# Patient Record
Sex: FEMALE
[De-identification: names, ages, dates, MRNs, and addresses within clinical notes are randomized per-mention and may not be internally consistent; named-entity substitution may affect disease eponyms.]

---

## 2021-03-08 ENCOUNTER — HOSPITAL ENCOUNTER (OUTPATIENT)
Dept: HOSPITAL 90 - RAH | Age: 66
Discharge: HOME | End: 2021-03-08
Attending: FAMILY MEDICINE
Payer: COMMERCIAL

## 2021-03-08 DIAGNOSIS — Z12.31: Primary | ICD-10-CM

## 2021-03-08 PROCEDURE — 77067 SCR MAMMO BI INCL CAD: CPT

## 2022-08-18 ENCOUNTER — HOSPITAL ENCOUNTER (OUTPATIENT)
Dept: HOSPITAL 90 - LAB | Age: 67
Discharge: HOME | End: 2022-08-18
Attending: FAMILY MEDICINE
Payer: COMMERCIAL
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
## 2022-08-18 DIAGNOSIS — M19.071: ICD-10-CM

## 2022-08-18 DIAGNOSIS — M21.071: ICD-10-CM

## 2022-08-18 DIAGNOSIS — S90.119A: Primary | ICD-10-CM
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
## 2022-08-18 PROCEDURE — 73630 X-RAY EXAM OF FOOT: CPT

## 2023-01-30 ENCOUNTER — HOSPITAL ENCOUNTER (OUTPATIENT)
Dept: HOSPITAL 90 - RAH | Age: 68
Discharge: HOME | End: 2023-01-30
Attending: FAMILY MEDICINE
Payer: COMMERCIAL

## 2023-01-30 DIAGNOSIS — Z12.31: Primary | ICD-10-CM

## 2023-01-30 PROCEDURE — 77067 SCR MAMMO BI INCL CAD: CPT

## 2024-12-04 ENCOUNTER — HOSPITAL ENCOUNTER (OUTPATIENT)
Dept: HOSPITAL 90 - RAH | Age: 69
Discharge: HOME | End: 2024-12-04
Attending: STUDENT IN AN ORGANIZED HEALTH CARE EDUCATION/TRAINING PROGRAM
Payer: COMMERCIAL

## 2024-12-04 DIAGNOSIS — Z13.820: Primary | ICD-10-CM

## 2024-12-04 DIAGNOSIS — M81.0: ICD-10-CM

## 2024-12-04 PROCEDURE — 77080 DXA BONE DENSITY AXIAL: CPT

## 2024-12-04 NOTE — [facility]
DEXA BONE DENSITY SURVEY



HISTORY: Osteoporosis



COMPARISON: None



FINDINGS: Bone densitometry study was performed. Bone mineral density of

the lumbar spine is 1.291 gram per centimeter square which corresponds

to a T score of 2.2 and a Z score of 4.3. Bone mineral density of the

left hip is 1.007 grams per centimeter square which corresponds to a T

score of 0.3 and a Z score of 1.7.



IMPRESSION:

1. Normal bone mineral density of the lumbar spine and left hip.